# Patient Record
Sex: FEMALE | Race: WHITE | NOT HISPANIC OR LATINO | URBAN - METROPOLITAN AREA
[De-identification: names, ages, dates, MRNs, and addresses within clinical notes are randomized per-mention and may not be internally consistent; named-entity substitution may affect disease eponyms.]

---

## 2024-07-08 ENCOUNTER — OFFICE (OUTPATIENT)
Dept: URBAN - METROPOLITAN AREA CLINIC 19 | Facility: CLINIC | Age: 34
End: 2024-07-08
Payer: COMMERCIAL

## 2024-07-08 VITALS
HEART RATE: 82 BPM | DIASTOLIC BLOOD PRESSURE: 75 MMHG | WEIGHT: 215 LBS | SYSTOLIC BLOOD PRESSURE: 119 MMHG | OXYGEN SATURATION: 98 % | HEIGHT: 68 IN

## 2024-07-08 DIAGNOSIS — K92.1 MELENA: ICD-10-CM

## 2024-07-08 DIAGNOSIS — K21.9 GASTRO-ESOPHAGEAL REFLUX DISEASE WITHOUT ESOPHAGITIS: ICD-10-CM

## 2024-07-08 DIAGNOSIS — D50.9 IRON DEFICIENCY ANEMIA, UNSPECIFIED: ICD-10-CM

## 2024-07-08 PROCEDURE — 99204 OFFICE O/P NEW MOD 45 MIN: CPT

## 2024-07-08 RX ORDER — POLYETHYLENE GLYCOL 3350, SODIUM SULFATE, POTASSIUM CHLORIDE, MAGNESIUM SULFATE, AND SODIUM CHLORIDE FOR ORAL SOLUTION 178.7-7.3G
KIT ORAL
Qty: 1 | Refills: 0 | Status: ACTIVE
Start: 2024-07-08

## 2024-07-08 NOTE — SERVICEHPINOTES
34-year-old female is here with complaints of new onset of rectal bleeding 2 weeks ago having anal intercourse.  Reports the rectal bleeding has slowed down in frequency with bright red blood when she wipes and not mixed in her stool. Bowel movements are regular and denies any hard stool or straining. Taking Nexium 40 mg daily which controls her acid reflux.  Drinks carbonated beverages daily.  Does not eat late at night.  Denies nocturnal reflux symptoms.  Does not take any NSAIDs. Denies any trouble swallowing or nausea or vomiting. Denies any abdominal pain or abnormal weight loss.  Father diagnosed with lymphoma at age 58 which metastasized to his colon.  Paternal grandmother with colon cancer.  No family history of colon polyps or IBD.  No cardiac stents or congestive heart failure.  History of mitral valve prolapse in his followed by Dr. Jose R Duggan.  Not on any blood thinners or weight loss medications.  She has never had a colonoscopy.  She donated blood 2 months ago.  She does not have heavy menstrual cycles.  Outside blood work performed by her PCP on 07/02/2024 revealed hemoglobin 11.1, hematocrit 32.2, MCV 90.2, and RDW 12.9.  Ferritin 15, iron saturation 7, iron 24, and TIBC 362.  She recently started taking iron supplements.
br
[facility] blood work performed by her PCP in December 2023 revealed vitamin-D 13.2.  Normal CBC, CMP, and lipid panel.  STD screen negative.  RPR nonreactive.  Hepatitis-C antibody negative.  HIV antibody negative.